# Patient Record
Sex: FEMALE | Race: WHITE | URBAN - METROPOLITAN AREA
[De-identification: names, ages, dates, MRNs, and addresses within clinical notes are randomized per-mention and may not be internally consistent; named-entity substitution may affect disease eponyms.]

---

## 2020-07-21 ENCOUNTER — OFFICE VISIT (OUTPATIENT)
Dept: URBAN - METROPOLITAN AREA CLINIC 92 | Facility: CLINIC | Age: 75
End: 2020-07-21
Payer: MEDICARE

## 2020-07-21 ENCOUNTER — LAB OUTSIDE AN ENCOUNTER (OUTPATIENT)
Dept: URBAN - METROPOLITAN AREA CLINIC 92 | Facility: CLINIC | Age: 75
End: 2020-07-21

## 2020-07-21 VITALS
TEMPERATURE: 96.6 F | HEIGHT: 63 IN | BODY MASS INDEX: 17.72 KG/M2 | WEIGHT: 100 LBS | HEART RATE: 65 BPM | SYSTOLIC BLOOD PRESSURE: 112 MMHG | DIASTOLIC BLOOD PRESSURE: 71 MMHG

## 2020-07-21 DIAGNOSIS — R63.4 WEIGHT LOSS: ICD-10-CM

## 2020-07-21 DIAGNOSIS — R19.7 DIARRHEA: ICD-10-CM

## 2020-07-21 DIAGNOSIS — C20 RECTAL CANCER: ICD-10-CM

## 2020-07-21 PROCEDURE — 99204 OFFICE O/P NEW MOD 45 MIN: CPT | Performed by: INTERNAL MEDICINE

## 2020-07-21 PROCEDURE — 99244 OFF/OP CNSLTJ NEW/EST MOD 40: CPT | Performed by: INTERNAL MEDICINE

## 2020-07-21 NOTE — HPI-OTHER HISTORIES
Pt referred by Dr. New for diarrhea and hx of rectal CA Stage III. Rectal CA dx in 2017 sp chemo/rads/partial colectomy. Last colonoscopy 8/2019 with polyps and repeat rec'd in one year. Currently with diarrhea intermittently. 4-6 BMs per day.7lb wt loss. No constipation, GI bleeding, or upper GI symptoms. Will est with Dr. Stein in the next few weeks.   Sister with colon cancer. Pt has deferred genetic testing as she does not have any children.

## 2020-08-10 ENCOUNTER — OFFICE VISIT (OUTPATIENT)
Dept: URBAN - METROPOLITAN AREA SURGERY CENTER 16 | Facility: SURGERY CENTER | Age: 75
End: 2020-08-10

## 2020-08-31 ENCOUNTER — OFFICE VISIT (OUTPATIENT)
Dept: URBAN - METROPOLITAN AREA SURGERY CENTER 16 | Facility: SURGERY CENTER | Age: 75
End: 2020-08-31

## 2020-09-10 ENCOUNTER — OFFICE VISIT (OUTPATIENT)
Dept: URBAN - METROPOLITAN AREA SURGERY CENTER 16 | Facility: SURGERY CENTER | Age: 75
End: 2020-09-10
Payer: MEDICARE

## 2020-09-10 ENCOUNTER — CLAIMS CREATED FROM THE CLAIM WINDOW (OUTPATIENT)
Dept: URBAN - METROPOLITAN AREA CLINIC 4 | Facility: CLINIC | Age: 75
End: 2020-09-10
Payer: MEDICARE

## 2020-09-10 DIAGNOSIS — K29.60 ADENOPAPILLOMATOSIS GASTRICA: ICD-10-CM

## 2020-09-10 DIAGNOSIS — K63.89 OTHER SPECIFIED DISEASES OF INTESTINE: ICD-10-CM

## 2020-09-10 DIAGNOSIS — B96.81 HELICOBACTER PYLORI [H. PYLORI] AS THE CAUSE OF DISEASES CLASSIFIED ELSEWHERE: ICD-10-CM

## 2020-09-10 DIAGNOSIS — R19.7 ACUTE DIARRHEA: ICD-10-CM

## 2020-09-10 DIAGNOSIS — B96.81 BACTERIAL INFECTION DUE TO H. PYLORI: ICD-10-CM

## 2020-09-10 DIAGNOSIS — K29.60 OTHER GASTRITIS WITHOUT BLEEDING: ICD-10-CM

## 2020-09-10 PROCEDURE — 88342 IMHCHEM/IMCYTCHM 1ST ANTB: CPT | Performed by: PATHOLOGY

## 2020-09-10 PROCEDURE — 88305 TISSUE EXAM BY PATHOLOGIST: CPT | Performed by: PATHOLOGY

## 2020-09-10 PROCEDURE — 45380 COLONOSCOPY AND BIOPSY: CPT | Performed by: INTERNAL MEDICINE

## 2020-09-10 PROCEDURE — 43239 EGD BIOPSY SINGLE/MULTIPLE: CPT | Performed by: INTERNAL MEDICINE

## 2020-09-10 PROCEDURE — 88313 SPECIAL STAINS GROUP 2: CPT | Performed by: PATHOLOGY

## 2020-09-10 PROCEDURE — G8907 PT DOC NO EVENTS ON DISCHARG: HCPCS | Performed by: INTERNAL MEDICINE

## 2020-09-10 PROCEDURE — G9937 DIG OR SURV COLSCO: HCPCS | Performed by: INTERNAL MEDICINE

## 2020-09-10 NOTE — HPI-OTHER HISTORIES
Pt seen in July for diarrhea and hx of rectal CA Stage III. Rectal CA dx in 2017 sp chemo/rads/partial colectomy. Colonoscopy 8/2019 with polyps and repeat rec'd in one year. Surveillance colon 9/10: colo-colonic anast in  rectum, patent and healthy and ow normal to TI.EGD same day with gastric erosions; bx + chronic HP, colon bx neg CT scattered hepatic hypodensities up to 1.1cm ow normal  Currently with diarrhea intermittently. 4-6 BMs per day.7lb wt loss. No constipation, GI bleeding, or upper GI symptoms. Will est with Dr. Stein in the next few weeks.   Sister with colon cancer. Pt has deferred genetic testing as she does not have any children.

## 2020-09-14 ENCOUNTER — OFFICE VISIT (OUTPATIENT)
Dept: URBAN - METROPOLITAN AREA CLINIC 92 | Facility: CLINIC | Age: 75
End: 2020-09-14

## 2020-09-17 ENCOUNTER — TELEPHONE ENCOUNTER (OUTPATIENT)
Dept: URBAN - METROPOLITAN AREA CLINIC 5 | Facility: CLINIC | Age: 75
End: 2020-09-17

## 2020-09-17 RX ORDER — AMOXICILLIN 500 MG/1
2 CAPSULES CAPSULE ORAL
Qty: 56 CAPSULE | Refills: 0 | OUTPATIENT
Start: 2020-09-17 | End: 2020-10-01

## 2020-09-17 RX ORDER — CLARITHROMYCIN 500 MG/1
1 TABLET TABLET, FILM COATED ORAL
Qty: 28 TABLET | Refills: 0 | OUTPATIENT
Start: 2020-09-17 | End: 2020-10-01

## 2020-09-17 RX ORDER — OMEPRAZOLE 40 MG/1
AS DIRECTED CAPSULE, DELAYED RELEASE ORAL BID
Qty: 28 | Refills: 0 | OUTPATIENT
Start: 2020-09-17

## 2020-09-23 ENCOUNTER — TELEPHONE ENCOUNTER (OUTPATIENT)
Dept: URBAN - METROPOLITAN AREA CLINIC 92 | Facility: CLINIC | Age: 75
End: 2020-09-23

## 2020-09-29 ENCOUNTER — TELEPHONE ENCOUNTER (OUTPATIENT)
Dept: URBAN - METROPOLITAN AREA CLINIC 92 | Facility: CLINIC | Age: 75
End: 2020-09-29

## 2020-10-21 ENCOUNTER — OFFICE VISIT (OUTPATIENT)
Dept: URBAN - METROPOLITAN AREA CLINIC 92 | Facility: CLINIC | Age: 75
End: 2020-10-21

## 2020-10-21 NOTE — HPI-OTHER HISTORIES
Pt referred by Dr. New for diarrhea and hx of rectal CA Stage III. Rectal CA dx in 2017 sp  chemo/rads/partial colectomy. Last colonoscopy 8/2019 with polyps and repeat rec'd in one year. Currently with diarrhea intermittently. Rx'd triple therapy and went from BM 4-6/day to  4-6 BMs per day.7lb wt loss. No constipation, GI bleeding, or upper GI symptoms. Will est with Dr. Stein in the next few weeks.   Sister with colon cancer. Pt has deferred genetic testing as she does not have any children. [place]

## 2021-01-21 ENCOUNTER — DASHBOARD ENCOUNTERS (OUTPATIENT)
Age: 76
End: 2021-01-21

## 2021-01-26 ENCOUNTER — OFFICE VISIT (OUTPATIENT)
Dept: URBAN - METROPOLITAN AREA TELEHEALTH 2 | Facility: TELEHEALTH | Age: 76
End: 2021-01-26
Payer: MEDICARE

## 2021-01-26 DIAGNOSIS — A04.8 HELICOBACTER PYLORI (H. PYLORI) INFECTION: ICD-10-CM

## 2021-01-26 DIAGNOSIS — C20 RECTAL CANCER: ICD-10-CM

## 2021-01-26 DIAGNOSIS — K76.9 LIVER LESION: ICD-10-CM

## 2021-01-26 DIAGNOSIS — R19.7 DIARRHEA: ICD-10-CM

## 2021-01-26 DIAGNOSIS — R63.4 WEIGHT LOSS: ICD-10-CM

## 2021-01-26 PROBLEM — 300331000: Status: ACTIVE | Noted: 2021-01-25

## 2021-01-26 PROCEDURE — 99442 PHONE E/M BY PHYS 11-20 MIN: CPT | Performed by: PHYSICIAN ASSISTANT

## 2021-01-26 RX ORDER — OMEPRAZOLE 40 MG/1
AS DIRECTED CAPSULE, DELAYED RELEASE ORAL BID
Qty: 28 | Refills: 0 | Status: DISCONTINUED | COMMUNITY
Start: 2020-09-17

## 2021-01-26 NOTE — HPI-OTHER HISTORIES
75yoF seen 9/2020 for diarrhea and hx of rectal CA Stage III. Rectal CA dx in 2017 sp  chemo/rads/partial colectomy. Colonoscopy 8/2019 with polyps and repeat rec'd in one year. CT 9/2020 indeterminate hepatic hypdensities up to 1.1cm ow normal EGD/Colon 9/10/2020 non-bleeding gastric erosions; prior end-to-end anast in rectum ow normal to TI; Bx + H pylori but neg colitis She notes after the colonoscopy had severe constipation and BRBPR that resolved with a laxative and hemm creams. She made dietary changes and the constipation resolved and diarrhea is rare. No abd pain. No anorexia or weight loss. Tx with triple therapy whichs she completed "most of"--stopped early 2' constipation and no GERD sx.  Referred to Dr. Stein who she established care with him.  Sister with colon cancer. Pt has deferred genetic testing as she does not have any children.

## 2021-01-28 ENCOUNTER — TELEPHONE ENCOUNTER (OUTPATIENT)
Dept: URBAN - METROPOLITAN AREA CLINIC 92 | Facility: CLINIC | Age: 76
End: 2021-01-28

## 2021-03-17 ENCOUNTER — OFFICE VISIT (OUTPATIENT)
Dept: URBAN - METROPOLITAN AREA CLINIC 92 | Facility: CLINIC | Age: 76
End: 2021-03-17

## 2021-03-30 ENCOUNTER — LAB OUTSIDE AN ENCOUNTER (OUTPATIENT)
Dept: URBAN - METROPOLITAN AREA CLINIC 19 | Facility: CLINIC | Age: 76
End: 2021-03-30

## 2024-11-22 NOTE — PHYSICAL EXAM GASTROINTESTINAL
----- Message from Sabi Mars sent at 11/22/2024 12:03 PM EST -----  Please call Jess and let her know the pathology results from her recent colonoscopy returned benign, with no sign of colon cancer.  She had multiple precancerous polyps, including the one that was unable to be removed completely.  We will discuss that at her upcoming office visit in early December.      Thanks,  JOE   Abdomen , soft, nontender, nondistended , no guarding or rigidity , no masses palpable , normal bowel sounds , Liver and Spleen , no hepatomegaly present , no hepatosplenomegaly , liver nontender , spleen not palpable